# Patient Record
(demographics unavailable — no encounter records)

---

## 2024-11-13 NOTE — REASON FOR VISIT
[Follow-Up] : a follow-up visit [Lung Cancer] : lung cancer [Sleep Apnea] : sleep apnea [COPD] : COPD no

## 2024-11-13 NOTE — HISTORY OF PRESENT ILLNESS
[Stable] : stable [Difficulty Breathing During Exertion] : stable dyspnea on exertion [Feelings Of Weakness On Exertion] : stable exercise intolerance [Wheezing] : worsened wheezing [Adherent] : the patient is not adherent with ~his/her~ medication regimen [Goals--Doing Well] : the patient is doing well with ~his/her~ COPD goals [PFTs] : pulmonary function tests [Follow-Up - Routine Clinic] : a routine clinic follow-up of [Snoring] : snoring [Unrefreshing Sleep] : unrefreshing sleep [Currently Experiencing] : The patient is currently experiencing symptoms. [None] : No associated symptoms are reported [CPAP] : CPAP [Poor Compliance] : poor compliance with treatment [Good Tolerance] : good tolerance of treatment [Good Symptom Control] : good symptom control

## 2024-11-13 NOTE — ASSESSMENT
[FreeTextEntry1] : Moderate COPD well compensated on Anoro  Now Anoro very expensive  HO NSCLC T1A, NO, MO sp resection. MIRELLA  SHAHID not using CPAP  HST with Severe SHAHID  TIA

## 2025-01-14 NOTE — PHYSICAL EXAM
[Alert] : alert [No Acute Distress] : in no acute distress [Sclera] : the sclera and conjunctiva were normal [Normal Outer Ear/Nose] : the ears and nose were normal in appearance [Normal Appearance] : the appearance of the neck was normal [Supple] : the neck was supple [No Respiratory Distress] : no respiratory distress [No Acc Muscle Use] : no accessory muscle use [Respiration, Rhythm And Depth] : normal respiratory rhythm and effort [Auscultation Breath Sounds / Voice Sounds] : lungs were clear to auscultation bilaterally [Normal S1, S2] : normal S1 and S2 [Heart Rate And Rhythm] : heart rate was normal and rhythm regular [Edema] : edema was not present [Abdomen Tenderness] : non-tender [Abdomen Soft] : soft [No Spinal Tenderness] : no spinal tenderness [No Clubbing, Cyanosis] : no clubbing or cyanosis of the fingernails [Involuntary Movements] : no involuntary movements were seen [] : no rash [Skin Lesions] : no skin lesions [Oriented To Time, Place, And Person] : oriented to person, place, and time [de-identified] : + edema LLE, chronic and at baseline [de-identified] : + left jorge alberto, + Dysarthria

## 2025-01-14 NOTE — PHYSICAL EXAM
[Alert] : alert [No Acute Distress] : in no acute distress [Sclera] : the sclera and conjunctiva were normal [Normal Outer Ear/Nose] : the ears and nose were normal in appearance [Normal Appearance] : the appearance of the neck was normal [Supple] : the neck was supple [No Respiratory Distress] : no respiratory distress [No Acc Muscle Use] : no accessory muscle use [Respiration, Rhythm And Depth] : normal respiratory rhythm and effort [Auscultation Breath Sounds / Voice Sounds] : lungs were clear to auscultation bilaterally [Normal S1, S2] : normal S1 and S2 [Heart Rate And Rhythm] : heart rate was normal and rhythm regular [Edema] : edema was not present [Abdomen Tenderness] : non-tender [Abdomen Soft] : soft [No Spinal Tenderness] : no spinal tenderness [No Clubbing, Cyanosis] : no clubbing or cyanosis of the fingernails [Involuntary Movements] : no involuntary movements were seen [] : no rash [Skin Lesions] : no skin lesions [Oriented To Time, Place, And Person] : oriented to person, place, and time [de-identified] : + edema LLE, chronic and at baseline [de-identified] : + left jorge alberto, + Dysarthria

## 2025-01-14 NOTE — HISTORY OF PRESENT ILLNESS
[FreeTextEntry1] : Patient seen and examined at home.  Patient is homebound 2/2 generalized weakness/debility.  81M w/PMHx of CVA w/residual left jorge alberto and dysarthria, COPD, Lung Ca, HTN, HLD, seen for HVP FU visit.  HHA and wife present during visit.  All report patient at baseline.  No acute complaints.  No CP/SOB. No abdominal complaints with good appetite and regular BM's.  No urinary complaints.  No recent falls.  No skin wounds.

## 2025-01-14 NOTE — ASSESSMENT
[FreeTextEntry1] : #Hx of Lung Cancer s/p resection  #COPD - at baseline - c/w LABA/ICS/LAMA - b2 PRN - outpatient Pulm FU, Dr. Fisher  #Hx of CVA s/p ILR implantation and removal 2023 - residual left jorge alberto and dysarthria - Hx of dysphagia, thickend liquids, soft and bite sized.  SLP following - c/w ASA/Statin - outpatient Neuro FU - reportedly on Eliquis for CVA PPX.  Followed with EPS 2023, had loop recorder but no AF burden noted.  Unclear why patient on it but has been on it several years - outpatient Cards FU for further clarification  # Questionable Hx of Atrial FIB - last EPS note says no AF burden noted on Loop recorder - Question of whether or not needs to stop, Cards continued it.  Seems to be in NSR today, but will defer to Cards to stop, has been on several years - needs outpatient FU  #HTN - BP stable - c/w current Rx - DASH Diet  #Bipolar D/O - c/w Lithium and Lamictal - Following with Psych  #Hx of Prostate Ca s/p prostatectomy - circa 2017 - not following with Urology  #Generalized Weakness #Gait Instability - DME for ambulation - pain control - fall precautions - c/w PT/OT

## 2025-01-24 NOTE — REASON FOR VISIT
[Follow-Up] : a follow-up visit [Lung Cancer] : lung cancer [Sleep Apnea] : sleep apnea [COPD] : COPD

## 2025-01-24 NOTE — ASSESSMENT
[FreeTextEntry1] : Moderate COPD On [place]   Anoro very expensive  HO NSCLC T1A, NO, MO sp resection. MIRELLA  SHAHID not using CPAP  HST with Severe SHAHID  TIA

## 2025-01-29 NOTE — HISTORY OF PRESENT ILLNESS
[FreeTextEntry1] : Since last visit, he developed elevated BP, thought to be due to tizanidine. The medication was discontinued. Added BP medications. Had further SLP evaluation and treatment with benefit. No dysphagia. Speech is better.  No change of mobility. No fall. Pain is limited to left upper extremity due to spasticity. Other pain from PNS dysfunction well controlled with pregabalin, on lower dosage. Getting [facility] benefit.  Last visit was on June 7, 2024.   The tizanidine on higher dosage had helped his stiffness better at night time. He had better quality of sleep. He tried diazepam without added benefit. Day time dosage gives him some degree of tiredness. Aid only gives him one tab during the day. He was taking 8mg nightly, 6mg daily with good tolerance.  Since then, he had ran out of supply of tizanidine. He was seen by Dr. Rooney. Tizanidine was replaced with Baclofen. He had poor tolerance to the medication and stopped after one tab. He was also recommended to have speech therapy in 3/2024, for which he has intermittently. He is going to start OT. Years ago, he had an episode of slurred speech and left sided weakness, diagnosed of stroke and the symptoms resolved within 2 months. On 4/25/2019, he had a sudden onset of slurred speech, swallowing difficulty and left sided weakness again. PT/OT, SLP treatment initiated almost immediately after a diagnosis of stroke. He also had loop monitor and feeding tube placed, which has been removed. He had acute care for 2 weeks then transferred to NH for further treatment. He was discharged eventually in 8/19. He had home care after he was discharged for 12 hours. Pending swallowing test next week. He walks with walker with aid watching him all the time. Outdoor is dependent on wheelchair. Mobility overall has not changed. His left leg is still in pain due to stiffness. Left arm stiffness is also affecting his effectiveness of PT. PMD put him on Lyrica, which helps the pain. Dosage was raised. Persistent numbness of lower extremities, associated with tingling. Still has intermittent low back pain. He maintains on pregabalin. He has not been taking diclofenac. He denies real bladder incontinence. He wears diaper because of the weakness and not able to make it to bathroom. No active neck pain. Loop recorder removed. No event recorded. Repeat chest CT recently showed no recurrent cancer. Ongoing care by psychiatrist. Maintains on lamotrigine and Lithium.

## 2025-01-29 NOTE — DISCUSSION/SUMMARY
[FreeTextEntry1] : Due to poor tolerance to antispasmodic medications, he needs more aggressive PT/OT to help his spasticity. [facility] if [facility] can offer [facility]. Botox is also alternative management to be considered. However, he is on oral anticoagulant. He will discuss with [facility] provider. PNS dysfunction well-controlled with medications and supportive care. Will continue multispecialty care.

## 2025-01-29 NOTE — PROCEDURE
[FreeTextEntry1] : Imaging - MRI: Note: 4/27/19 MRA head and neck, reported unremarkable. Images reviewed. 4/30/19 brain: reported acute infarct right BG, extended to corona radiata. Unchanged extensive white matter changes cerebral hemispheres and brainstem. Images reviewed. 8/4/2020 lumbar spine: DJDs in multiple levels, levoscoliosis, though no severe compression. Images reviewed. 7/1/22 lumbar spine reported largely unchanged since 2020, multilevel foraminal stenosis. No severe spinal canal stenosis. CT Scan - Note: 4/26/19 CT head: reported acute right BG infarct with extension to corona radiata. images reviewed 4/28/19, CTA, head: reported unremarkable. Images reviewed.  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 6/10/22 NCS/EMG: moderate distal sensory motor polyneuropathy; chronic active bilateral L4, L5 and S1 radiculopathy; resolved right ulnar nerve entrapment at the wrist.

## 2025-04-24 NOTE — PHYSICAL EXAM
[Alert] : alert [Well Nourished] : well nourished [No Acute Distress] : in no acute distress [EOMI] : extraocular movements were intact [Normal Outer Ear/Nose] : the ears and nose were normal in appearance [Supple] : the neck was supple [JVD] : there was jugular-venous distention [No Respiratory Distress] : no respiratory distress [Auscultation Breath Sounds / Voice Sounds] : lungs were clear to auscultation bilaterally [Respiration, Rhythm And Depth] : normal respiratory rhythm and effort [Normal S1, S2] : normal S1 and S2 [Heart Rate And Rhythm] : heart rate was normal and rhythm regular [Abdomen Tenderness] : non-tender [Abdomen Soft] : soft [de-identified] : trace edema bilat at ankles [de-identified] : obese

## 2025-04-24 NOTE — REVIEW OF SYSTEMS
[Negative] : Gastrointestinal [Feeling Poorly] : not feeling poorly [Chest Pain] : no chest pain [Palpitations] : no palpitations [Shortness Of Breath] : no shortness of breath [Wheezing] : no wheezing [Cough] : no cough [de-identified] : no changes from baseline deficits

## 2025-04-24 NOTE — ASSESSMENT
[FreeTextEntry1] : CAD ([place])      - cont lipid control with atorvastatin and omega XL; also ASA; also eliquis for  paroxysmal a fib; no evid acute pulmonary edema or unstable angina  Pulmonary ([place])      - COPD: fluti/salmed inhaler; albuterol prn      - hx lung cancer: he gets CT annually for f/u      - SHAHID: cont CPAP  post CVA - cont with OT                 - will order ST for pt - has had in past and feels he is declining  HTN - controlled with amlodipine and   cont all other maint meds hemodynamically stable RTH 4 months.

## 2025-04-24 NOTE — HISTORY OF PRESENT ILLNESS
[FreeTextEntry1] : 82-year-old male seen at home for medical f/u wife an HHSOURAV Mancera (who has cared for patient for 5 years) present during evaluation. [facility].      1. [facility]      2. He is being trained for electric w/c by [facility]      3. [facility] increased pregabalin to 75mg q HS      Also sees some specialists at [facility]: cardiol (Cassie) and pulmonary (Phillip)  Reports overall feeling well; no appetite issues Is able to ambulate about 30 feet with high-walker; post CVA he has left HP - moderate of LUE and dense of LLE